# Patient Record
Sex: MALE | Race: BLACK OR AFRICAN AMERICAN | NOT HISPANIC OR LATINO | Employment: FULL TIME | URBAN - METROPOLITAN AREA
[De-identification: names, ages, dates, MRNs, and addresses within clinical notes are randomized per-mention and may not be internally consistent; named-entity substitution may affect disease eponyms.]

---

## 2018-12-10 ENCOUNTER — HOSPITAL ENCOUNTER (EMERGENCY)
Facility: HOSPITAL | Age: 58
Discharge: HOME | End: 2018-12-10
Attending: EMERGENCY MEDICINE

## 2018-12-10 VITALS
HEART RATE: 81 BPM | TEMPERATURE: 99 F | DIASTOLIC BLOOD PRESSURE: 92 MMHG | SYSTOLIC BLOOD PRESSURE: 162 MMHG | WEIGHT: 222 LBS | HEIGHT: 71 IN | OXYGEN SATURATION: 96 % | RESPIRATION RATE: 16 BRPM | BODY MASS INDEX: 31.08 KG/M2

## 2018-12-10 DIAGNOSIS — T15.90XA FOREIGN BODY IN EYE, UNSPECIFIED LATERALITY, INITIAL ENCOUNTER: Primary | ICD-10-CM

## 2018-12-10 PROBLEM — S05.32XA: Status: ACTIVE | Noted: 2018-12-10

## 2018-12-10 LAB
HGB S BLD QL SOLY: NEGATIVE
INR PPP: 1
PROTHROMBIN TIME: 10.8 SEC

## 2018-12-10 PROCEDURE — 85610 PROTHROMBIN TIME: CPT

## 2018-12-10 PROCEDURE — 63600175 PHARM REV CODE 636 W HCPCS: Performed by: NURSE PRACTITIONER

## 2018-12-10 PROCEDURE — 99284 EMERGENCY DEPT VISIT MOD MDM: CPT

## 2018-12-10 PROCEDURE — 99281 EMR DPT VST MAYX REQ PHY/QHP: CPT | Mod: 25,,, | Performed by: OPHTHALMOLOGY

## 2018-12-10 PROCEDURE — 65210 REMOVE FOREIGN BODY FROM EYE: CPT | Mod: LT,,, | Performed by: OPHTHALMOLOGY

## 2018-12-10 PROCEDURE — 65205 REMOVE FOREIGN BODY FROM EYE: CPT

## 2018-12-10 PROCEDURE — 99284 EMERGENCY DEPT VISIT MOD MDM: CPT | Mod: ,,, | Performed by: EMERGENCY MEDICINE

## 2018-12-10 PROCEDURE — 85660 RBC SICKLE CELL TEST: CPT

## 2018-12-10 RX ORDER — DICLOFENAC SODIUM 75 MG/1
75 TABLET, DELAYED RELEASE ORAL 2 TIMES DAILY
COMMUNITY

## 2018-12-10 RX ORDER — MOXIFLOXACIN 5 MG/ML
1 SOLUTION/ DROPS OPHTHALMIC 4 TIMES DAILY
Qty: 10 ML | Refills: 0 | Status: SHIPPED | OUTPATIENT
Start: 2018-12-10

## 2018-12-10 RX ORDER — AMLODIPINE BESYLATE 10 MG/1
10 TABLET ORAL DAILY
COMMUNITY

## 2018-12-10 RX ADMIN — FLUORESCEIN SODIUM AND BENOXINATE HYDROCHLORIDE 1 DROP: 4; 2.5 SOLUTION OPHTHALMIC at 01:12

## 2018-12-10 NOTE — ED PROVIDER NOTES
Encounter Date: 12/10/2018       History     Chief Complaint   Patient presents with    Eye Injury     piece of iron flew into my eye     The patient is a 58-year-old male with no significant medical history presenting to the ED for eye injury.  Patient states he was working early this morning and a piece of iron from a piece of equipment he was using broke off and flew into his left eye.  Patient denies any pain but does endorse decreased vision in the left eye.          Review of patient's allergies indicates:  Allergies not on file  No past medical history on file.  No past surgical history on file.  No family history on file.  Social History     Tobacco Use    Smoking status: Not on file   Substance Use Topics    Alcohol use: Not on file    Drug use: Not on file     Review of Systems   Eyes: Positive for pain, redness and visual disturbance. Negative for photophobia, discharge and itching.   Neurological: Negative for dizziness, syncope, facial asymmetry, weakness and headaches.       Physical Exam     Initial Vitals [12/10/18 1227]   BP Pulse Resp Temp SpO2   (!) 159/88 100 16 98.5 °F (36.9 °C) 96 %      MAP       --         Physical Exam    Nursing note and vitals reviewed.  Constitutional: Vital signs are normal. He appears well-developed and well-nourished.   HENT:   Head: Normocephalic and atraumatic. Head is without abrasion, without contusion and without laceration. Hair is normal.   Nose: Nose normal.   Eyes: EOM and lids are normal. Lids are everted and swept, no foreign bodies found. Left eye exhibits chemosis. Left conjunctiva has a hemorrhage.   Fundoscopic exam:       The left eye shows hemorrhage.   Slit lamp exam:       The left eye shows corneal abrasion, foreign body and fluorescein uptake.   Neck: No muscular tenderness present.   Cardiovascular: Normal rate and regular rhythm.   Pulmonary/Chest: Effort normal.   Abdominal: Normal appearance.   Musculoskeletal: Normal range of motion.    Neurological: He is alert and oriented to person, place, and time. No sensory deficit. GCS eye subscore is 4. GCS verbal subscore is 5. GCS motor subscore is 6.   Skin: Skin is warm, dry and intact. Capillary refill takes less than 2 seconds. No rash noted. No cyanosis. Nails show no clubbing.         ED Course   Procedures  Labs Reviewed   SICKLE CELL SCREEN   PROTIME-INR          Imaging Results          CT Orbits Sella Post Fossa Without Cont (Final result)     Abnormal  Result time 12/10/18 15:03:59    Final result by Dieudonne Moreno MD (12/10/18 15:03:59)                 Impression:      Two punctate metallic densities along the margin of the left globe, compatible with foreign bodies.    Left medial orbital wall fracture as above, thought likely remote.  Clinical correlation advised.    This report was flagged in Epic as abnormal.      Electronically signed by: Dieudonne Moreno MD  Date:    12/10/2018  Time:    15:03             Narrative:    EXAMINATION:  CT ORBITS SELLA POST FOSSA WITHOUT CONT    CLINICAL HISTORY:  Trauma to eye;  metallic object in left eye    TECHNIQUE:  Axial CT images obtained throughout the region of the orbits without the use of intravenous contrast.  Axial, sagittal and coronal reconstructions were performed.    COMPARISON:  No priors    FINDINGS:  Two punctate foci of metallic density present along the superficial margin of the left globe.  These lesions are in the medial aspect of the globe at the level of lens (sequence 14 image 7, sequence 3 image 25).  The globe maintains normal morphology without evidence of rupture.  No significant surrounding inflammatory stranding.    There is a fracture of the left medial orbital wall with herniation of the intraorbital fat into the ethmoidal region.  Fracture defect measures 1.5 x 2.2 cm, with approximately 0.7 cm of medial displacement.  Subtle distortion of the traversing the medial rectus muscle could reflect some scarring or  entrapment.  Fracture is thought likely remote.  There is no adjacent hemorrhage or fluid within the ethmoids.  Subtle associated enophthalmos.    The remainder of the osseous structures are intact.  No other fracture identified.    Intraorbital contents otherwise unremarkable.  No intraorbital mass or inflammatory change.  This report was flagged in Epic as abnormal.    Limited intracranial evaluation is unremarkable.                                       APC / Resident Notes:   Emergent evaluation of a 59 yo male patient presenting to the ER with chief complaint of left eye injury. Patient states he was at work this afternoon when a piece of iron broke off of a piece of equipment he was using hit him in the left eye.  Patient denies any eye pain on exam.  On exam patient A&O x3. Subconjunctival hemorrhage noted to left eye.  Foreign body noted on exam.  Differential diagnoses include but are not limited to conjunctivitis (infectious, allergic or irritant), corneal abrasion, corneal foreign body, iritis, christina-orbital cellulitis, or acute glaucoma.  I discussed the care of this patient with my Supervising Physician.      Ophthalmology consult and will be down to see patient.  Upper requesting CT for rule out foreign body.    CT shows two punctate metallic densities along the margin of the left globe, compatible with foreign bodies. Left medial orbital wall fracture as above, thought likely remote.  Ophtho called.  Will be down to see patient.             Attending Attestation:     Physician Attestation Statement for NP/PA:   I have conducted a face to face encounter with this patient in addition to the NP/PA, due to Medical Complexity          Attending ED Notes:   58y M with acute onset left eye pain. Exam concerning for imbedded foreign. Optho consulted.             Clinical Impression:   The encounter diagnosis was Foreign body in eye, unspecified laterality, initial encounter.      Disposition:   CT orbits  ordered, final disposition turned over to oncoming staff.                        Leonora Carrasco NP  12/11/18 0806       Mendy Altamirano MD  12/11/18 0944

## 2018-12-10 NOTE — HPI
Mr. tAkinson is a 57 y/o gentleman who presents to [facility] ED following an injury to his left eye today while working with an iron tool today earlier today. Patient states he was drilling with a tool and when he pulled it out a piece of metal broke off and went into his left eye. Patient was not wearing goggles at the time. He states some mild left eye pain, but denies any change in his vision, denies flashes/floaters/scotomas/diplopia OU.     PMHx: HTN, HLD  POHx: None  All: NDKA  PSHx: no previous ocular surgeries

## 2018-12-10 NOTE — PROVIDER PROGRESS NOTES - EMERGENCY DEPT.
Encounter Date: 12/10/2018    ED Physician Progress Notes        Physician Note:   S/o to me. Eye injury with FB, subconj hemorrhage and subconj laceration?   Pending CT orbits.   Seen by ophtho.  Pending above and final ophtho recs.

## 2018-12-10 NOTE — ED NOTES
Patient arrives ambulatory to ED #18 for evaluation of left eye injury that happened just prior to arrival - patient states he was working with tools, when tool broke and caused same to hit him in left eye - patient denies other injury - states his eye did bleed for a few minutes - globe intact with hyphema noted to left medial and lower aspect of eye - states minimal blurred vision to affected eye

## 2018-12-10 NOTE — ASSESSMENT & PLAN NOTE
- Pt with h/o drilling earlier today with piece of tool breaking off and hitting patient in left eye while not wearing goggles  - Pt denies pain/scotomas/floaters/flashes/diplopia; denies previous ocular history  - Vasc 20/40 OU, IOP WNL OU, no APD, no Hyphema, no cell/flare  - Inf subconj heme with small ~1mm subconj laceration with no visible scleral laceration or corneal laceration  - CT Orbits with thin slices shows 2 small foreign bodies embedded likely in sclera, difficult view on exam with subconj heme present  - Subconj irrigated with normal saline in clinic with removal of foreign body from subconjunctiva/superficial surface of sclera after procedure described to patient including R/B/A and consent signed  - Remainder of anterior exam WNL OU  - DFE WNL OU, no signs of IOFB, retinal detachment/holes/tears  - Start Vigamox QID OD   - Follow up within 1 week for evaluation, return precautions discussed with pt  -  will call with appt

## 2018-12-11 NOTE — ED NOTES
HO3 - assumption of care.     I have assessed patient independently while awaiting ophthalmology recommendations and evaluation, they have evaluated patient in clinic with staff and removed the foreign bodies from the eye, patient continues to have subconjuctival hemorrhage in the effected eye. I have written a prescription for vigamox to be used 4 times daily for 10 days, with follow up with ophthalmology in 7 days. Patient has been informed of this and he agrees with this plan.     Andre Mckenzie MD PGY3  12/10/2018 6:16 PM     Andre Mckenzie MD  Resident  12/10/18 8157

## 2018-12-11 NOTE — CONSULTS
[facility]  Ophthalmology  Consult Note    Patient Name: Christina Kramer  MRN: [id number]  Admission Date: 12/10/2018  Hospital Length of Stay: 0 days  Attending Provider: Mendy Altamirano MD   Primary Care Physician: Santo Mendieta MD  Principal Problem:<principal problem not specified>    Inpatient consult to Ophthalmology  Consult performed by: Hans Song MD  Consult ordered by: Leonora Carrasco NP  Reason for consult: left eye injury        Subjective:     Chief Complaint:  Left eye injury    HPI:   Mr. Atkinson is a 59 y/o gentleman who presents to [facility] ED following an injury to his left eye today while working with an iron tool today earlier today. Patient states he was drilling with a tool and when he pulled it out a piece of metal broke off and went into his left eye. Patient was not wearing goggles at the time. He states some mild left eye pain, but denies any change in his vision, denies flashes/floaters/scotomas/diplopia OU.     PMHx: HTN, HLD  POHx: None  All: NDKA  PSHx: no previous ocular surgeries    No new subjective & objective note has been filed under this hospital service since the last note was generated.      Base Eye Exam     Visual Acuity (Snellen - Linear)       Right Left    Dist sc 20/40 20/40          Tonometry (Tonopen, 1:48 PM)       Right Left    Pressure 11 12          Pupils       Pupils Dark Light Shape React APD    Right PERRL 4 2 Round Brisk None    Left PERRL 4 2 Round Brisk None          Visual Fields       Right Left     Full Full          Extraocular Movement       Right Left     Full, Ortho Full, Ortho          Neuro/Psych     Oriented x3:  Yes    Mood/Affect:  Normal          Dilation     Both eyes:  2.5% Phenylephrine, 1% Mydriacyl @ 1:47 PM            Slit Lamp and Fundus Exam     External Exam       Right Left    External Normal Normal          Slit Lamp Exam       Right Left    Lids/Lashes Normal Normal    Conjunctiva/Sclera White and quiet  Inf subconj heme and chemosis, ~1mm conj laceration without visible fb, no visible scleral laceration    Cornea Clear Clear    Anterior Chamber Deep and quiet Deep and quiet    Iris Round and reactive Round and reactive    Lens 1+ Nuclear sclerosis 1+ Nuclear sclerosis    Vitreous Normal Normal          Fundus Exam       Right Left    Disc Tortuous vessels at optic nerve head, p/f/s Tortuous vessel at optic nerve head, p/f/s    C/D Ratio 0.4 0.2    Macula Normal, flat and attached Normal, flat and attached    Vessels Vascular attenuation, AV nicking, Tortuous Vascular attenuation, AV nicking, Tortuous    Periphery Normal, no H/T/D Normal, no H/T/D              Assessment and Plan:     Laceration of left conjunctiva    - Pt with h/o drilling earlier today with piece of tool breaking off and hitting patient in left eye while not wearing goggles  - Pt denies pain/scotomas/floaters/flashes/diplopia; denies previous ocular history  - Vasc 20/40 OU, IOP WNL OU, no APD, no Hyphema, no cell/flare  - Inf subconj heme with small ~1mm subconj laceration with no visible scleral laceration or corneal laceration  - CT Orbits with thin slices shows 2 small foreign bodies embedded likely in sclera, difficult view on exam with subconj heme present  - Subconj irrigated with normal saline in clinic with removal of foreign body from subconjunctiva/superficial surface of sclera after procedure described to patient including R/B/A and consent signed  - Remainder of anterior exam WNL OU  - DFE WNL OU, no signs of IOFB, retinal detachment/holes/tears  - Start Vigamox QID OD   - Follow up within 1 week for evaluation, return precautions discussed with pt  -  will call with appt         Thank you for your consult. I will follow-up with patient. Please contact us if you have any additional questions.     Case seen and discussed with James Rene MD PGY-III and Dr. Abeba Song MD  Ophthalmology  [facility]    Procedure note:    One drop of anesthetic was placed in left palpebral fissure.  The patient was prepped and draped in the usual sterile manner for ophthalmic plastic surgery. The conjunctiva laceration was explored.  A metal foreign body was removed. The adjacent subconjunctival and Tenon's space was explored.  No other foreign bodies were found. The patient tolerated the procedure well. There were no complications.

## 2018-12-17 ENCOUNTER — OFFICE VISIT (OUTPATIENT)
Dept: OPHTHALMOLOGY | Facility: CLINIC | Age: 58
End: 2018-12-17

## 2018-12-17 DIAGNOSIS — S05.32XD LACERATION OF LEFT CONJUNCTIVA, SUBSEQUENT ENCOUNTER: ICD-10-CM

## 2018-12-17 DIAGNOSIS — Z98.890 POST-OPERATIVE STATE: Primary | ICD-10-CM

## 2018-12-17 PROCEDURE — 99024 POSTOP FOLLOW-UP VISIT: CPT | Mod: ,,, | Performed by: OPHTHALMOLOGY

## 2018-12-17 PROCEDURE — 99999 PR PBB SHADOW E&M-EST. PATIENT-LVL II: CPT | Mod: PBBFAC,,, | Performed by: OPHTHALMOLOGY

## 2018-12-17 PROCEDURE — 99212 OFFICE O/P EST SF 10 MIN: CPT | Mod: PBBFAC | Performed by: OPHTHALMOLOGY

## 2018-12-17 RX ORDER — HYDROCODONE BITARTRATE AND ACETAMINOPHEN 10; 325 MG/1; MG/1
TABLET ORAL
Refills: 0 | COMMUNITY
Start: 2018-10-17

## 2018-12-17 NOTE — PROGRESS NOTES
HPI     Pt here fore ER f/u.  Seen in ER on 12/10/18.  Pt presented with laceration of left conjunctiva.    Eye pain: 0/10.  Pt c/o of tearing.  Pt denies eye pain, blurry vision, double vision, dryness, black spots,   floaters, flashes.    Eye meds:  moxifloxacin 0.5% 1 drop every 6 hours os.    Last edited by Kayden Bueno on 12/17/2018  9:01 AM. (History)            Assessment /Plan     For exam results, see Encounter Report.    Post-operative state    Laceration of left conjunctiva, subsequent encounter      Patient doing well! Post-operative instructions reviewed.  Continue moxifloxacin for 5 days and then can stop.  All questions answered.  Return prn sooner any worsening of vision/symptoms or any concerns.